# Patient Record
Sex: MALE | Race: WHITE | NOT HISPANIC OR LATINO | Employment: UNEMPLOYED | URBAN - METROPOLITAN AREA
[De-identification: names, ages, dates, MRNs, and addresses within clinical notes are randomized per-mention and may not be internally consistent; named-entity substitution may affect disease eponyms.]

---
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
## 2022-04-09 ENCOUNTER — HOSPITAL ENCOUNTER (EMERGENCY)
Facility: HOSPITAL | Age: 22
Discharge: HOME | End: 2022-04-10
Attending: EMERGENCY MEDICINE
Payer: COMMERCIAL
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
## 2022-04-09 DIAGNOSIS — F10.929 ALCOHOL INTOXICATION (HCC): Primary | ICD-10-CM

## 2022-04-09 PROCEDURE — 99284 EMERGENCY DEPT VISIT MOD MDM: CPT

## 2022-04-10 ENCOUNTER — APPOINTMENT (EMERGENCY)
Dept: CT IMAGING | Facility: HOSPITAL | Age: 22
End: 2022-04-10
Payer: COMMERCIAL

## 2022-04-10 VITALS
RESPIRATION RATE: 16 BRPM | TEMPERATURE: 97.5 F | SYSTOLIC BLOOD PRESSURE: 106 MMHG | HEART RATE: 68 BPM | OXYGEN SATURATION: 96 % | DIASTOLIC BLOOD PRESSURE: 66 MMHG

## 2022-04-10 LAB — ETHANOL SERPL-MCNC: 204 MG/DL (ref 0–3)

## 2022-04-10 PROCEDURE — 36415 COLL VENOUS BLD VENIPUNCTURE: CPT | Performed by: INTERNAL MEDICINE

## 2022-04-10 PROCEDURE — 99285 EMERGENCY DEPT VISIT HI MDM: CPT | Performed by: EMERGENCY MEDICINE

## 2022-04-10 PROCEDURE — 82077 ASSAY SPEC XCP UR&BREATH IA: CPT | Performed by: INTERNAL MEDICINE

## 2022-04-10 PROCEDURE — G1004 CDSM NDSC: HCPCS

## 2022-04-10 PROCEDURE — 70450 CT HEAD/BRAIN W/O DYE: CPT

## 2022-04-10 PROCEDURE — 72125 CT NECK SPINE W/O DYE: CPT

## 2022-04-10 RX ORDER — ONDANSETRON 4 MG/1
4 TABLET, ORALLY DISINTEGRATING ORAL ONCE
Status: COMPLETED | OUTPATIENT
Start: 2022-04-10 | End: 2022-04-10

## 2022-04-10 RX ADMIN — ONDANSETRON 4 MG: 4 TABLET, ORALLY DISINTEGRATING ORAL at 00:14

## 2022-04-10 NOTE — ED ATTENDING ATTESTATION
4/9/2022  IEllie DO, saw and evaluated the patient  I have discussed the patient with the resident/non-physician practitioner and agree with the resident's/non-physician practitioner's findings, Plan of Care, and MDM as documented in the resident's/non-physician practitioner's note, except where noted  All available labs and Radiology studies were reviewed  I was present for key portions of any procedure(s) performed by the resident/non-physician practitioner and I was immediately available to provide assistance  At this point I agree with the current assessment done in the Emergency Department  I have conducted an independent evaluation of this patient a history and physical is as follows:    23 yo M presenting with ETOH intoxication and s/p fall down a hill  Coming from [facility]  Visibly intoxicated and has vomit on himself, however is able to answer questions and protecting airway  He denies any areas of pain/injury       Plan: Zofran, CTH/C-spine, observe    ED Course         Critical Care Time  Procedures

## 2022-04-10 NOTE — DISCHARGE INSTRUCTIONS
You have been evaluated in the Emergency Department today for alcohol intoxication  You have been observed in the Emergency Department and are now able to walk on your own and are tolerating fluids/food  Please follow up with your primary care physician  Return to the Emergency Department if you experience shaking, seizures, palpitations, inability to keep down fluids, worsening or uncontrolled pain, confusion, or for any other concerning symptoms

## 2022-04-10 NOTE — ED NOTES
[address] security to notify them that patient has been discharged      [facility]  04/10/22 7728

## 2022-04-10 NOTE — ED PROVIDER NOTES
History  Chief Complaint   Patient presents with    Alcohol Intoxication     Patient brought by EMS from [facility]  Intoxicated, fell down hill  Denies head injury, LOC  No thinners  Vomiting      HPI  70-year-old man with no significant past medical history presents to ED for evaluation of intoxication  Patient reports he was drinking at college today  He reports he had "a lot of alcohol" tonight  Apparently EMS was called after he rolled down a grass hill  He denies any pain  He denies head strike or loss consciousness  He denies blood thinners  Reports nausea and vomiting but he otherwise feels fine  Patient denies headache, visual changes, dizziness, fevers, chills, chest pain, palpitations, abdominal pain, diarrhea, dysuria, new skin rashes or numbness or tingling of the extremities  None       Past Medical History:   Diagnosis Date    Seasonal allergies        History reviewed  No pertinent surgical history  History reviewed  No pertinent family history  I have reviewed and agree with the history as documented  E-Cigarette/Vaping     E-Cigarette/Vaping Substances     Social History     Tobacco Use    Smoking status: Never Smoker    Smokeless tobacco: Never Used   Substance Use Topics    Alcohol use: Yes     Comment: socially     Drug use: No        Review of Systems   All other systems reviewed and are negative        Physical Exam  ED Triage Vitals [04/09/22 2356]   Temperature Pulse Respirations Blood Pressure SpO2   97 5 °F (36 4 °C) 88 18 133/89 97 %      Temp Source Heart Rate Source Patient Position - Orthostatic VS BP Location FiO2 (%)   Oral Monitor Lying Left arm --      Pain Score       No Pain             Orthostatic Vital Signs  Vitals:    04/09/22 2356 04/10/22 0230   BP: 133/89 106/66   Pulse: 88 68   Patient Position - Orthostatic VS: Lying Lying       Physical Exam  PHYSICAL EXAM    Constitutional:  Well developed, well nourished, no acute distress, non-toxic appearance    HEENT:  Conjunctiva normal  Oropharynx moist  Respiratory:  No respiratory distress, normal breath sounds  Cardiovascular:  Normal rate, normal rhythm, no murmurs  GI:  Soft, nondistended, normal bowel sounds, nontender  :  No costovertebral angle tenderness   Musculoskeletal:  No edema, no tenderness, no deformities  Integument:  Well hydrated, no rash   Lymphatic:  No lymphadenopathy noted   Neurologic:  Alert & oriented to person and place but not to time, normal motor function, normal sensory function, no focal deficits noted   Psychiatric:  Speech and behavior appropriate   ED Medications  Medications   ondansetron (ZOFRAN-ODT) dispersible tablet 4 mg (4 mg Oral Given 4/10/22 0014)       Diagnostic Studies  Results Reviewed     Procedure Component Value Units Date/Time    Ethanol [[id number]]  (Abnormal) Collected: 04/10/22 0120    Lab Status: Final result Specimen: Blood from Arm, Right Updated: 04/10/22 0136     Ethanol Lvl 204 mg/dL                  CT spine cervical without contrast   ED Interpretation by Hawk Merrill DO (04/10 0232)   FINDINGS:     ALIGNMENT:  Normal alignment of the cervical spine  No subluxation      VERTEBRAL BODIES:  No fracture  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      DEGENERATIVE CHANGES:  No significant cervical degenerative changes are noted      PREVERTEBRAL AND PARASPINAL SOFT TISSUES:  Unremarkable      THORACIC INLET:  Normal      IMPRESSION:     No cervical spine fracture or traumatic malalignment  Final Result by Raza Ackerman MD (04/10 0126)      No cervical spine fracture or traumatic malalignment  Workstation performed: [id number]         CT head without contrast   ED Interpretation by Hawk Merrill DO (04/10 0232)   FINDINGS:     PARENCHYMA:  No intracranial mass, mass effect or midline shift  No CT signs of acute infarction    No acute parenchymal hemorrhage      VENTRICLES AND EXTRA-AXIAL SPACES:  No hydrocephalus  No acute extra-axial hemorrhage  Mild prominence of the retrocerebellar CSF space suggestive of samina cisterna magna      VISUALIZED ORBITS AND PARANASAL SINUSES:  Large retention cyst in the right maxillary sinus      CALVARIUM AND EXTRACRANIAL SOFT TISSUES:  Normal      IMPRESSION:     No acute intracranial abnormality  Final Result by Alysha Bray MD (04/10 0121)      No acute intracranial abnormality  Workstation performed: [id number]               Procedures  Procedures      ED Course  ED Course as of 04/10/22 1716   Sun Apr 10, 2022   0123 CTH:  No acute intracranial abnormality  6663 CT cervical spine: No cervical spine fracture or traumatic malalignment  3650 S/O: D/C when clinically sober                                       [facility]  Number of Diagnoses or Management Options  Alcohol intoxication ([address] )  Fall down embankment (hill), initial encounter  Diagnosis management comments: 44-year-old man with no significant past medical history presents to ED for evaluation of intoxication  The patient is altered in the ED  Pt is also actively vomiting  Will give him Zofran  CT head and cervical spine showed no acute findings   Pt observed in the ED until he was clinically sober and was then discharged home       Amount and/or Complexity of Data Reviewed  Clinical lab tests: ordered and reviewed  Tests in the radiology section of CPT®: ordered and reviewed  Discussion of test results with the performing providers: yes  Review and summarize past medical records: yes  Discuss the patient with other providers: yes    Risk of Complications, Morbidity, and/or Mortality  Presenting problems: moderate  Diagnostic procedures: moderate  Management options: low    Patient Progress  Patient progress: improved      Disposition  Final diagnoses:   Alcohol intoxication ([address] )   Fall down embankment (hill), initial encounter     Time reflects when diagnosis was documented in both MDM as applicable and the Disposition within this note     Time User Action Codes Description Comment    4/10/2022  1:23 AM Juvenal Dull Add [F10 929] Alcohol intoxication ([address] )     4/10/2022  1:23 AM Juvenal Dull Add [W17 81XA] Fall down embankment (hill), initial encounter       ED Disposition     ED Disposition Condition Date/Time Comment    Discharge Stable Sun Apr 10, 2022  1:23 AM Niki Kraft discharge to home/self care  Follow-up Information     Follow up With Specialties Details Why Contact Info Additional Information    Infolink  Call in 1 day  [facility] Emergency Medicine  As needed [facility] [id number]  [address] [facility] Emergency Department, [address] [place] [address], [facility]          There are no discharge medications for this patient  No discharge procedures on file  PDMP Review     None           ED Provider  Attending physically available and evaluated Niki Kraft  I managed the patient along with the ED Attending      Electronically Signed by         Jakob Levy MD  04/10/22 4152

## 2022-04-10 NOTE — ED NOTES
Patient is awake, alert and oriented  Requesting to be discharged  Provider aware        Brooke Sheikh RN  04/10/22 2570